# Patient Record
Sex: FEMALE | Race: WHITE
[De-identification: names, ages, dates, MRNs, and addresses within clinical notes are randomized per-mention and may not be internally consistent; named-entity substitution may affect disease eponyms.]

---

## 2017-03-14 ENCOUNTER — HOSPITAL ENCOUNTER (OUTPATIENT)
Dept: HOSPITAL 47 - RADMAMWWP | Age: 72
End: 2017-03-14
Payer: MEDICARE

## 2017-03-14 DIAGNOSIS — Z12.31: Primary | ICD-10-CM

## 2017-03-14 PROCEDURE — 77063 BREAST TOMOSYNTHESIS BI: CPT

## 2023-06-27 ENCOUNTER — HOSPITAL ENCOUNTER (OUTPATIENT)
Dept: HOSPITAL 47 - RADNMMAIN | Age: 78
Discharge: HOME | End: 2023-06-27
Attending: FAMILY MEDICINE
Payer: MEDICARE

## 2023-06-27 DIAGNOSIS — R07.89: Primary | ICD-10-CM

## 2023-06-27 PROCEDURE — 93017 CV STRESS TEST TRACING ONLY: CPT

## 2023-06-27 PROCEDURE — 78452 HT MUSCLE IMAGE SPECT MULT: CPT

## 2023-06-28 NOTE — CA
Lexiscan Nuclear Stress Test Report 

 

 Name:    Lizzy Mayorga 

 

 MRN:    [id number] 

 

 

 

 Exam Date: 2023 10:06 

 

 Exam Location:      [facility]  

                     Stress 

 

 Ht (in):     61     Wt (lb):     203    BSA:    1.90 

 

 Ordering Phys:       Rachana Lopez DO 

 

 Referring Phys:      RACHANA LOPEZ,, 

 

 Technologist:        Sloan Boston 

 

 Age:    78    Gender:    F 

 

 :    1945 

 Procedure CPT: 

 

 Indications:              R07.89 other chest pain 

 

 ICD-10 Codes: 

 

 Patient History: 

 

 Medications: 

 

 Meds past 24 hrs: 

 

 Pretest Chest Pain: 

 

 STRESS TEST      Lexiscan 

 

 Protocol 

 

 

 

 

 Exercise Duration (min:sec):         02:00 

 Max ST Depressions (mm): 

 Angina Score: 

 Strauss Score: 

 Resting HR (bpm):      59 

 

 Peak HR (bpm):         80 

 

 Resting BP (mmHg):       131    /   69 

 

 Peak BP (mmHg):       118   /   60 

 

 MPHR:    140     Target HR:      119 

 

 % MPHR:     57 

 METS:  1.0 

 

 Total Dose: 

 Peak Dose: 

 Atropine: 

 Double Product:       9440 

 

 BP Response: 

 

 Stress Termination:       PROTOCOL COMPLETE 

 

 Stress Symptoms: 

 NO SYMPTOMS 

 

 Stress Summary: 

 

 

  ECG ANALYSIS 

 

 Resting ECG: 

 

 Stress ECG: 

 

 CONCLUSIONS 

 Baseline EKG revealed normal sinus rhythm without significant  

 ST-T changes.  Rare isolated PVCs were noted.  With Lexiscan  

 administration the heart rate changed from 59-77 bpm and the  

 blood pressure changed from 131//55.  Patient was  

 asymptomatic.  EKG was unremarkable.  By EKG criteria this is a  

 unremarkable Lexiscan stress test.  The nuclear scan results  

 which are more pertinent will be reported by the radiologist 

 

 Dr. Lily Castano MD 

 (Electronically Signed) 

 Final Date:      2023 10:45

## 2023-06-29 NOTE — NM
EXAMINATION TYPE: NM stress lexiscan cardiolite

 

DATE OF EXAM: 6/27/2023

 

COMPARISON: NONE

 

CLINICAL INDICATION: Female, 78 years old with history of R07.89 OTHER CHEST PAIN;

 

TECHNIQUE:  After the intravenous administration of 9.9 mCi Tc 99m Sestamibi - Cardiolite resting SPE
CT images acquired 45 minutes post injection. 

 

The patient received 0.4mg Lexiscan, 25.3 mCi Tc 99m Sestamibi - Stress images obtained 30 minutes po
st injection 

 

FINDINGS:  

 

Review of stress and rest SPECT images demonstrates a small reversible ischemia involving the cardiac
 apex. Gated analysis shows normal wall motion with an estimated left ventricular ejection fraction o
f 69 %.

 

 

 

IMPRESSION:  

 

SPECT images demonstrates a small reversible ischemia involving the cardiac apex.

## 2023-08-17 ENCOUNTER — HOSPITAL ENCOUNTER (OUTPATIENT)
Dept: HOSPITAL 47 - CATHCVL | Age: 78
LOS: 1 days | Discharge: HOME | End: 2023-08-18
Attending: INTERNAL MEDICINE
Payer: MEDICARE

## 2023-08-17 DIAGNOSIS — E78.5: ICD-10-CM

## 2023-08-17 DIAGNOSIS — Z79.899: ICD-10-CM

## 2023-08-17 DIAGNOSIS — I11.9: ICD-10-CM

## 2023-08-17 DIAGNOSIS — E66.3: ICD-10-CM

## 2023-08-17 DIAGNOSIS — I77.1: ICD-10-CM

## 2023-08-17 DIAGNOSIS — I25.10: Primary | ICD-10-CM

## 2023-08-17 DIAGNOSIS — Z79.4: ICD-10-CM

## 2023-08-17 DIAGNOSIS — Z79.1: ICD-10-CM

## 2023-08-17 DIAGNOSIS — E11.9: ICD-10-CM

## 2023-08-17 DIAGNOSIS — Z88.5: ICD-10-CM

## 2023-08-17 DIAGNOSIS — Z86.79: ICD-10-CM

## 2023-08-17 LAB — GLUCOSE BLD-MCNC: 141 MG/DL (ref 70–110)

## 2023-08-17 PROCEDURE — 93458 L HRT ARTERY/VENTRICLE ANGIO: CPT

## 2023-08-17 PROCEDURE — 92978 ENDOLUMINL IVUS OCT C 1ST: CPT

## 2023-08-17 PROCEDURE — 82565 ASSAY OF CREATININE: CPT

## 2023-08-17 RX ADMIN — VERAPAMIL HYDROCHLORIDE ONE ML: 2.5 INJECTION, SOLUTION INTRAVENOUS at 13:23

## 2023-08-17 RX ADMIN — HEPARIN SODIUM ONE UNIT: 1000 INJECTION, SOLUTION INTRAVENOUS; SUBCUTANEOUS at 14:12

## 2023-08-17 RX ADMIN — HEPARIN SODIUM ONE UNIT: 1000 INJECTION, SOLUTION INTRAVENOUS; SUBCUTANEOUS at 13:07

## 2023-08-17 RX ADMIN — CEFAZOLIN SCH: 330 INJECTION, POWDER, FOR SOLUTION INTRAMUSCULAR; INTRAVENOUS at 19:56

## 2023-08-17 RX ADMIN — HEPARIN SODIUM ONE UNIT: 1000 INJECTION, SOLUTION INTRAVENOUS; SUBCUTANEOUS at 13:18

## 2023-08-17 RX ADMIN — VERAPAMIL HYDROCHLORIDE ONE ML: 2.5 INJECTION, SOLUTION INTRAVENOUS at 13:02

## 2023-08-17 NOTE — P.PCN
Date of Procedure: 08/17/23


Operative Findings: 








CARDIAC CATHETERIZATION AND PERCUTANEOUS CORONARY INTERVENTION





PERFORMING PHYSICIAN: 


Huber Sylvester MD, [facility]





PROCEDURE PERFORMED:


1.  Selective right and left coronary angiogram


2.  Left heart catheterization


3.  Successful stenting of proximal LAD using 4.0 x 18 mm Xience ULICES with an 

excellent angiographic results 


4.  Adjunctive use of intravascular ultrasound


5.  Right radial artery angiogram





INDICATION:


An 78-year-old female patient was experiencing symptoms of chest discomfort and 

underwent myocardial perfusion imaging stress test showed ischemia.  In the 

light of that a heart catheterization was advised





COMPLICATION:


None





APPROACH:


Right radial artery





LEVEL OF SEDATION:


Moderate with the sedation time off 66 minutes





PROCEDURE DESCRIPTION:


After obtaining an informed consent the patient was brought to the cardiac cath 

lab.  The right radial artery was cannulated using micropuncture technique and 

the micropuncture wire passed easily then I placed a 6-Azeri sheath.  I gave 

the patient 2 mg of verapamil intra-arterial and 6000 use of heparin and 

intravenous.  Selective right and left coronary angiogram performed using JR4 

and JL 3.5 catheters.  Left heart catheterization was performed using the JR4 c

atheter which cross the aortic valve then I did pulled back across the valve.  

The procedure was completed with no complication.  Subsequently I did intervene 

on the left anterior descending artery.





SELECTIVE CORONARY ANGIOGRAM:


The right coronary artery:


Large caliber vessel and a dominant vessel.  The proximal RCA has intermediate 

to severe lesion appeared to be in the range of 60-70%.  The mid and distal RCA 

appeared to be angiographically normal.





Left main:


It is angiographically normal.  Bifurcates into an LCx and LAD





The left circumflex:


Large caliber vessel non-dominant vessel.  The LCx appeared to be 

angiographically normal.  Gives rises into an OM1 which appears to be normal.





The left anterior descending artery:


Large caliber vessel.  The proximal LAD has a lesion appeared to be in the range

of 80-90%.  Was identified on the PETERS cranial the most.  The LAD proximally 

gives rises into the first and second diagonal branches and the lesion is is in 

between the first and second diagonal branches.  The LAD in the mid and distal 

portion appears to be angiographically normal





HEMODYNAMICS:


LVEDP was 12 mmHg was no significant gradient across aortic valve





PCI OF THE LAD:


Anticoagulation was initiated using heparin with continuous ACT monitoring.  We 

had heart time advancing the guiding catheter over 035 wire because the right 

subclavian was extremely tortuous.  At that point I was able to advanced an 035 

glide advantage wire and exchanged out into an Amplatz stiff wire.  Over that I 

was able to advance JL 3.5 guiding catheter.  The left main was engaged.  

Subsequently I wire the LAD using a run-through wire.  Intravascular ultrasound 

was performed and showed a diameter of the LAD around 4 mm and the LAD was not 

calcified.  Balloon angioplasty was performed using 3.5 mm balloon before I 

deployed 40 by 18 mm stent where the stent was positioned under fluoroscopy 

guidance and deployed under fluoroscopy guidance and postdilated using 4 mm 

noncompliant balloon.  Final angiogram showed good angiographic results and the 

procedure was completed was no complication





CONCLUSION:


1. Severe disease involving the proximal LAD.  I did perform successful stenting

of the proximal LAD


2. 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


3. Normal left-sided filling pressure


4. Extremely tortuous right subclavian artery





POSTPROCEDURE MANAGEMENT:


1.  Dual antiplatelet therapy using aspirin and Plavix for at least 6 month


2.  Assess the hemodynamic significance of the RCA lesion


3.  Follow-up with the patient

## 2023-08-18 VITALS — HEART RATE: 61 BPM | RESPIRATION RATE: 17 BRPM

## 2023-08-18 VITALS — TEMPERATURE: 98.4 F | SYSTOLIC BLOOD PRESSURE: 106 MMHG | DIASTOLIC BLOOD PRESSURE: 58 MMHG

## 2023-08-18 LAB
GLUCOSE BLD-MCNC: 157 MG/DL (ref 70–110)
GLUCOSE BLD-MCNC: 64 MG/DL (ref 70–110)
GLUCOSE BLD-MCNC: 75 MG/DL (ref 70–110)
GLUCOSE BLD-MCNC: 84 MG/DL (ref 70–110)

## 2023-08-18 NOTE — P.DS
Providers


Attending physician: 


Huber Sylvester





Consults: 





                                        





08/17/23 14:16


Consult Physician Routine 


   Consulting Provider: [facility]


   Consult Reason/Comments: Post Interventional patient


   Do you want consulting provider notified?: Already Contacted











Primary care physician: 


Rachana [facility] Course: 





The patient is an 78-year-old female patient who underwent yesterday a heart 

catheterization and was found to have severe disease involving the proximal LAD 

which was stented and intermediate to severe disease involving the RCA.





The procedure was performed from right radial approach and was challenging 

because of tortuosity right subclavian.  We get good angiographic results by the

end





The patient was seen this morning.  She is asymptomatic and hemodynamically 

stable.  The patient is going to be discharged home on dual antiplatelet therapy

and statin and I'll follow-up with the patient next week in the office





Plan - Discharge Summary


Discharge Rx Participant: Yes


New Discharge Prescriptions: 


No Action


   Insulin Aspart Prot/Insuln Asp [Novolog MIX 70-30 Flexpen] 16 units SQ AC-

BRKFST


   Fish Oil(Unk) 1 tab PO DAILY


   Candesartan/Hydrochlorothiazid [Candesartan-Hctz 32-12.5 mg Tb] 1 tab PO 

DAILY


   Baclofen 10 mg PO DAILY


   Omeprazole [PriLOSEC] 20 mg PO AC-BRKFST


   Montelukast [Singulair] 10 mg PO DAILY


   Vit C(Unk) 1 tab PO DAILY


   Verapamil HCl [Verapamil ER] 120 mg PO DAILY


   Probiotic(Unk) 1 tab PO DAILY


   Ipratropium Bromide 0.06%Nasal [Atrovent Nasal 0.06%] 1 dose INHALATION DAILY


   Insulin Aspart Prot/Insuln Asp [Novolog MIX 70-30 Flexpen] 18 units SQ 1700


   Escitalopram [Lexapro] 5 mg PO DAILY PRN


     PRN Reason: Anxiety


   D3()Unk) 1 tab PO DAILY


   Calsium/Magnesium(Unk) 1 tab PO DAILY


Discharge Medication List





Baclofen 10 mg PO DAILY 08/09/23 [History]


Calsium/Magnesium(Unk) 1 tab PO DAILY 08/09/23 [History]


Candesartan/Hydrochlorothiazid [Candesartan-Hctz 32-12.5 mg Tb] 1 tab PO DAILY 

08/09/23 [History]


D3()Unk) 1 tab PO DAILY 08/09/23 [History]


Escitalopram [Lexapro] 5 mg PO DAILY PRN 08/09/23 [History]


Fish Oil(Unk) 1 tab PO DAILY 08/09/23 [History]


Insulin Aspart Prot/Insuln Asp [Novolog MIX 70-30 Flexpen] 16 units SQ AC-BRKFST

08/09/23 [History]


Insulin Aspart Prot/Insuln Asp [Novolog MIX 70-30 Flexpen] 18 units SQ 1700 

08/09/23 [History]


Ipratropium Bromide 0.06%Nasal [Atrovent Nasal 0.06%] 1 dose INHALATION DAILY 

08/09/23 [History]


Montelukast [Singulair] 10 mg PO DAILY 08/09/23 [History]


Omeprazole [PriLOSEC] 20 mg PO AC-BRKFST 08/09/23 [History]


Probiotic(Unk) 1 tab PO DAILY 08/09/23 [History]


Verapamil HCl [Verapamil ER] 120 mg PO DAILY 08/09/23 [History]


Vit C(Unk) 1 tab PO DAILY 08/09/23 [History]








Follow up Appointment(s)/Referral(s): 


Huber Sylvester MD [STAFF PHYSICIAN] - 08/30/23 3:15 pm (Appointment is at Main 

Office)


Patient Instructions/Handouts:  Moderate Sedation (DC), Cardiac Rehabilitation 

(DC), Coronary Intravascular Stent Placement (DC), After Radial Heart 

Catheterization (GEN)


Activity/Diet/Wound Care/Special Instructions: 














No driving for two days





Ok to shower tomorrow but no baths, pools, lakes, doing dishes by hand for five 

days.





Signs of infection IE: fever, rash, drainage from puncture site, swelling go to 

ER/doctor for immediate evaluation.





Avoid using right wrist/hand to bend, flex, lift greater than 5 lbs for five 

days.





For Heavy Bleeding of puncture site apply firm direct pressure and return to ER.

 Do not attempt to drive self.





low sodium/low fat diet





medications as directed by Cardiologists